# Patient Record
Sex: MALE | URBAN - METROPOLITAN AREA
[De-identification: names, ages, dates, MRNs, and addresses within clinical notes are randomized per-mention and may not be internally consistent; named-entity substitution may affect disease eponyms.]

---

## 2023-02-11 ENCOUNTER — APPOINTMENT (RX ONLY)
Dept: URBAN - METROPOLITAN AREA CLINIC 41 | Facility: CLINIC | Age: 28
Setting detail: DERMATOLOGY
End: 2023-02-11

## 2023-02-11 DIAGNOSIS — D22 MELANOCYTIC NEVI: ICD-10-CM | Status: STABLE

## 2023-02-11 PROBLEM — D22.39 MELANOCYTIC NEVI OF OTHER PARTS OF FACE: Status: ACTIVE | Noted: 2023-02-11

## 2023-02-11 PROCEDURE — 99202 OFFICE O/P NEW SF 15 MIN: CPT

## 2023-02-11 PROCEDURE — ? ADDITIONAL NOTES

## 2023-02-11 PROCEDURE — ? COUNSELING

## 2023-02-11 ASSESSMENT — LOCATION SIMPLE DESCRIPTION DERM
LOCATION SIMPLE: RIGHT CHEEK
LOCATION SIMPLE: RIGHT NOSE

## 2023-02-11 ASSESSMENT — LOCATION DETAILED DESCRIPTION DERM
LOCATION DETAILED: RIGHT LATERAL MALAR CHEEK
LOCATION DETAILED: RIGHT NASAL SIDEWALL

## 2023-02-11 ASSESSMENT — LOCATION ZONE DERM
LOCATION ZONE: NOSE
LOCATION ZONE: FACE

## 2023-02-11 NOTE — PROCEDURE: COUNSELING
Patient Specific Counseling (Will Not Stick From Patient To Patient): Pt had two moles on the face that he would like removed. Pt reports that they have been present since he was born and they haven’t changed since then. SP discusses that these moles can be removed, but they will require surgical excisions that may leave a scar. SP recommends pt see a plastic surgeon for this so that the scarring will be as limited as possible on the face. SP discusses that it can also be scraped off, but it may grow back since the root of the mole will still be there. Pt prefers plastic surgery. List of plastic surgeons was provided to pt.
Detail Level: Simple

## 2023-02-11 NOTE — HPI: SKIN LESION
Is This A New Presentation, Or A Follow-Up?: Skin Lesion
Additional History: New pt. Would like to discuss how to remove two moles on the face.